# Patient Record
Sex: FEMALE | Race: BLACK OR AFRICAN AMERICAN
[De-identification: names, ages, dates, MRNs, and addresses within clinical notes are randomized per-mention and may not be internally consistent; named-entity substitution may affect disease eponyms.]

---

## 2020-06-01 ENCOUNTER — HOSPITAL ENCOUNTER (OUTPATIENT)
Dept: HOSPITAL 84 - D.MRI | Age: 25
Discharge: HOME | End: 2020-06-01
Attending: ORTHOPAEDIC SURGERY
Payer: COMMERCIAL

## 2020-06-01 DIAGNOSIS — M54.12: Primary | ICD-10-CM

## 2020-06-17 ENCOUNTER — HOSPITAL ENCOUNTER (OUTPATIENT)
Dept: HOSPITAL 84 - D.MRI | Age: 25
Discharge: HOME | End: 2020-06-17
Attending: CLINICAL NURSE SPECIALIST
Payer: COMMERCIAL

## 2020-06-17 DIAGNOSIS — M25.511: Primary | ICD-10-CM

## 2020-07-16 ENCOUNTER — HOSPITAL ENCOUNTER (OUTPATIENT)
Dept: HOSPITAL 84 - D.OPS | Age: 25
Discharge: HOME | End: 2020-07-16
Attending: ORTHOPAEDIC SURGERY
Payer: COMMERCIAL

## 2020-07-16 VITALS — WEIGHT: 115 LBS | HEIGHT: 64 IN | BODY MASS INDEX: 19.63 KG/M2 | BODY MASS INDEX: 19.63 KG/M2

## 2020-07-16 VITALS — DIASTOLIC BLOOD PRESSURE: 95 MMHG | SYSTOLIC BLOOD PRESSURE: 131 MMHG

## 2020-07-16 DIAGNOSIS — M75.41: ICD-10-CM
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
## 2020-07-16 DIAGNOSIS — S43.431A: Primary | ICD-10-CM

## 2020-07-16 DIAGNOSIS — M25.511: ICD-10-CM

## 2020-07-16 LAB
ERYTHROCYTE [DISTWIDTH] IN BLOOD BY AUTOMATED COUNT: 12.8 % (ref 11.5–14.5)
HCG SERPL-ACNC: NEGATIVE M[IU]/ML
HCT VFR BLD CALC: 40.7 % (ref 36–48)
HGB BLD-MCNC: 13.5 G/DL (ref 12–16)
MCH RBC QN AUTO: 29.5 PG (ref 26–34)
MCHC RBC AUTO-ENTMCNC: 33.2 G/DL (ref 31–37)
MCV RBC: 88.9 FL (ref 80–100)
PLATELET # BLD: 213 10X3/UL (ref 130–400)
PMV BLD AUTO: 10.4 FL (ref 7.4–10.4)
RBC # BLD AUTO: 4.58 10X6/UL (ref 4–5.4)
WBC # BLD AUTO: 5 10X3/UL (ref 4.8–10.8)

## 2020-07-16 NOTE — NUR
1152 IV DC'D. CATHETER TIP INTACT. NO BLEEDING AT SITE. BANDAID
APPLIED.
1215 PT DRESSED AND READY FOR DISCHARGE. WAITING ON A WHEELCHAIR FOR
TRANSPORT HOME.

## 2020-07-17 NOTE — OP
PATIENT NAME:  GAIL ARTEAGA                             MEDICAL RECORD: [id number]
:89                                             LOCATION:D.OPS          
                                                         ADMISSION DATE:        
SURGEON:  INOCENCIO CONN MD        
 
 
DATE OF OPERATION:  2020
 
PREOPERATIVE DIAGNOSES:  Superior labrum anterior and posterior lesion of the
right shoulder with impingement syndrome.
 
POSTOPERATIVE DIAGNOSES:  Superior labrum anterior and posterior lesion of the
right shoulder with impingement syndrome.
 
PROCEDURES:
1.  Arthroscopic posterior labral tear debridement.
2.  Arthroscopic subacromial decompression, acromioplasty and bursectomy.
 
SURGEON:  Inocencio Conn MD
 
ANESTHESIA:  General.
 
INTRAOPERATIVE COMPLICATIONS:  None.
 
SUMMARY OF PATHOLOGIC FINDINGS:  The patient did indeed have a tear, it was in
the very posterior superior aspect with a large pannus indicating a tear of
internal impingement.  The bicipital labral junction was in excellent overall
condition anteriorly as well as underneath the bicipital labral junction.  The
patient did have a downward sloping acromion.  This was treated with
acromioplasty, AC joint was in good condition.
 
OPERATIVE SUMMARY IN DETAIL:  After obtaining the appropriate preoperative
orthopedic surgery consent as well as anesthetic consultation, evaluation and
clearance, the patient was brought to the operating room and placed on the
operating table in supine position.  After general laryngeal mask was
administered, the patient was placed in left lateral decubitus position.  All
pressure points were well padded to include down leg peroneal pad as well as
axillary roll.  The patient was held firmly to the operating table using the
vacuum pack suction system.  Right upper extremity and shoulder were then
prepped and draped in routine sterile fashion.  The arm was held in the Arthrex
traction boom at 30 degrees of forward flexion, 30 degrees of abduction, 10
pounds of traction laterally.  At this point, the appropriate timeout was taken
and agreed upon by all given the patient's unique identifiers.  Arthroscopy
portal was established in the port posteriorly.  Anterior portal was established
in the anterior safe interval under direct arthroscopic visualization.  At this
point, diagnostic arthroscopy was as noted above.  A resector was then utilized
to debride the posterior superior labral tear.  The rotator cuff in the
posterior superior cuff and all other portions of the cuff were in overall good
condition with only mild irritation.  Having completed this, attention was
turned to the subacromial space.  Accessory lateral portal was created through
which the Deweese tissue ablation system was utilized to denude the undersurface
of the acromion of all soft tissue elements and released the coracoacromial
ligament.  A 5-0 barrel bur was then used to perform acromioplasty at the level
of acromioclavicular joint.  Having completed this, arthroscopy portals were
closed in routine interrupted fashion using 4-0 Prolene.  Sterile dressings were
applied.  The patient was awakened and taken to the recovery room in stable
condition.  All final needle and sponge counts were correct.
 
 
 
 
OPERATIVE REPORT                               [id number]    GAIL ARTEAGA           
 
 
TRANSINT:[id number] Voice Confirmation ID: [id number] DOCUMENT ID: [id number]
                                           
                                           FABIEN PIERRE, INOCENCIO NEFF        
 
 
 
Electronically Signed by INOCENCIO CONN MD on 20 at 1421
 
 
 
 
 
 
 
 
 
 
 
 
 
 
 
 
 
 
 
 
 
 
 
 
 
 
 
 
 
 
 
 
 
 
 
 
 
 
 
 
CC:                                                             [id number]
DICTATION DATE: 20 1054     :     20 1310      [facility] 
                                                                      [id number] [place] [address]